# Patient Record
Sex: MALE | Race: WHITE | NOT HISPANIC OR LATINO | Employment: OTHER | URBAN - METROPOLITAN AREA
[De-identification: names, ages, dates, MRNs, and addresses within clinical notes are randomized per-mention and may not be internally consistent; named-entity substitution may affect disease eponyms.]

---

## 2017-08-28 ENCOUNTER — APPOINTMENT (EMERGENCY)
Dept: RADIOLOGY | Facility: HOSPITAL | Age: 62
End: 2017-08-28
Attending: EMERGENCY MEDICINE
Payer: COMMERCIAL

## 2017-08-28 ENCOUNTER — HOSPITAL ENCOUNTER (EMERGENCY)
Facility: HOSPITAL | Age: 62
Discharge: HOME | End: 2017-08-28
Attending: EMERGENCY MEDICINE
Payer: COMMERCIAL

## 2017-08-28 VITALS
TEMPERATURE: 97.8 F | RESPIRATION RATE: 16 BRPM | SYSTOLIC BLOOD PRESSURE: 163 MMHG | WEIGHT: 155 LBS | OXYGEN SATURATION: 97 % | DIASTOLIC BLOOD PRESSURE: 86 MMHG | HEART RATE: 64 BPM

## 2017-08-28 DIAGNOSIS — S43.001A SUBLUXATION OF RIGHT SHOULDER JOINT, INITIAL ENCOUNTER: ICD-10-CM

## 2017-08-28 DIAGNOSIS — S61.412A LACERATION OF LEFT HAND WITHOUT FOREIGN BODY, INITIAL ENCOUNTER: Primary | ICD-10-CM

## 2017-08-28 PROCEDURE — 99284 EMERGENCY DEPT VISIT MOD MDM: CPT

## 2017-08-28 PROCEDURE — 73030 X-RAY EXAM OF SHOULDER: CPT

## 2017-08-28 RX ORDER — LIDOCAINE HYDROCHLORIDE 10 MG/ML
5 INJECTION, SOLUTION EPIDURAL; INFILTRATION; INTRACAUDAL; PERINEURAL ONCE
Status: COMPLETED | OUTPATIENT
Start: 2017-08-28 | End: 2017-08-28

## 2017-08-28 RX ADMIN — LIDOCAINE HYDROCHLORIDE 5 ML: 10 INJECTION, SOLUTION EPIDURAL; INFILTRATION; INTRACAUDAL; PERINEURAL at 18:30

## 2020-03-23 ENCOUNTER — HOSPITAL ENCOUNTER (EMERGENCY)
Facility: HOSPITAL | Age: 65
Discharge: HOME | End: 2020-03-23
Attending: EMERGENCY MEDICINE
Payer: COMMERCIAL

## 2020-03-23 ENCOUNTER — APPOINTMENT (EMERGENCY)
Dept: RADIOLOGY | Facility: HOSPITAL | Age: 65
End: 2020-03-23
Payer: COMMERCIAL

## 2020-03-23 VITALS
BODY MASS INDEX: 26.8 KG/M2 | TEMPERATURE: 98.1 F | HEART RATE: 67 BPM | RESPIRATION RATE: 19 BRPM | HEIGHT: 64 IN | WEIGHT: 157 LBS | DIASTOLIC BLOOD PRESSURE: 87 MMHG | OXYGEN SATURATION: 97 % | SYSTOLIC BLOOD PRESSURE: 176 MMHG

## 2020-03-23 DIAGNOSIS — S61.211A LACERATION OF LEFT INDEX FINGER: Primary | ICD-10-CM

## 2020-03-23 PROCEDURE — 99283 EMERGENCY DEPT VISIT LOW MDM: CPT

## 2020-03-23 PROCEDURE — 90471 IMMUNIZATION ADMIN: CPT

## 2020-03-23 PROCEDURE — 90715 TDAP VACCINE 7 YRS/> IM: CPT | Performed by: EMERGENCY MEDICINE

## 2020-03-23 PROCEDURE — 99284 EMERGENCY DEPT VISIT MOD MDM: CPT | Performed by: EMERGENCY MEDICINE

## 2020-03-23 PROCEDURE — 73140 X-RAY EXAM OF FINGER(S): CPT

## 2020-03-23 PROCEDURE — 12041 INTMD RPR N-HF/GENIT 2.5CM/<: CPT | Performed by: EMERGENCY MEDICINE

## 2020-03-23 RX ORDER — LIDOCAINE HYDROCHLORIDE 20 MG/ML
10 INJECTION, SOLUTION EPIDURAL; INFILTRATION; INTRACAUDAL; PERINEURAL ONCE
Status: COMPLETED | OUTPATIENT
Start: 2020-03-23 | End: 2020-03-23

## 2020-03-23 RX ORDER — GINSENG 100 MG
1 CAPSULE ORAL ONCE
Status: COMPLETED | OUTPATIENT
Start: 2020-03-23 | End: 2020-03-23

## 2020-03-23 RX ADMIN — TETANUS TOXOID, REDUCED DIPHTHERIA TOXOID AND ACELLULAR PERTUSSIS VACCINE, ADSORBED 0.5 ML: 5; 2.5; 8; 8; 2.5 SUSPENSION INTRAMUSCULAR at 12:35

## 2020-03-23 RX ADMIN — LIDOCAINE HYDROCHLORIDE 10 ML: 20 INJECTION, SOLUTION EPIDURAL; INFILTRATION; INTRACAUDAL; PERINEURAL at 12:36

## 2020-03-23 RX ADMIN — BACITRACIN 1 SMALL APPLICATION: 500 OINTMENT TOPICAL at 13:36

## 2020-03-23 NOTE — DISCHARGE INSTRUCTIONS
Leave dressing on until Wednesday then remove and wash with soap and water  Dry thoroughly and apply antibiotic ointment - repeat the next day  After that wash daily with soap and water, dry thoroughly and leave open to air  If going to be in a dirty/belkis environment, keep covered with a dry bandage  Return for any redness, swelling, drainage, fever more than 101, or for any concerns

## 2020-03-23 NOTE — ED PROVIDER NOTES
History  Chief Complaint   Patient presents with    Finger Laceration     pt states he cut his left index finger with a  at work about an hour ago     69y M RHD w/ left index finger injury  Approx 2h pta was at work and was using a thin  when it slipped and went into his left index finger  Has laceration to the tip of the left index finger, bleeding currently controlled  Notes mild pain to the tip of the finger, no numbness/tingling  No other injuries  Unsure of last tetanus      History provided by:  Patient   used: No    Finger Laceration   Location:  Finger  Finger laceration location:  L index finger  Length:  1 5  Depth: Through underlying tissue  Quality: straight    Bleeding: venous and controlled    Time since incident:  2 hours  Injury mechanism:  wheel  Pain details:     Quality:  Aching    Severity:  Mild    Timing:  Constant  Foreign body present:  Unable to specify  Relieved by:  None tried  Worsened by:  Pressure  Ineffective treatments:  None tried  Tetanus status:  Unknown  Associated symptoms: no focal weakness, no numbness and no swelling        Prior to Admission Medications   Prescriptions Last Dose Informant Patient Reported? Taking?   metoprolol tartrate (LOPRESSOR) 25 mg tablet   Yes Yes   Sig: Take 25 mg by mouth daily      Facility-Administered Medications: None       Past Medical History:   Diagnosis Date    Hypertension        Past Surgical History:   Procedure Laterality Date    HERNIA REPAIR         History reviewed  No pertinent family history  I have reviewed and agree with the history as documented  E-Cigarette/Vaping     E-Cigarette/Vaping Substances     Social History     Tobacco Use    Smoking status: Former Smoker    Smokeless tobacco: Never Used   Substance Use Topics    Alcohol use: Never     Frequency: Never    Drug use: Never       Review of Systems   Skin: Positive for wound     Neurological: Negative for focal weakness, weakness and numbness  All other systems reviewed and are negative  Physical Exam  Physical Exam   Constitutional: He appears well-developed and well-nourished  HENT:   Nose: Nose normal    Eyes: Conjunctivae are normal    Neck: Neck supple  Cardiovascular: Normal rate  Pulmonary/Chest: Effort normal    Abdominal: He exhibits no distension  Musculoskeletal: He exhibits no deformity  Left hand: He exhibits tenderness and laceration  Normal sensation noted  Normal strength noted  Hands:  Neurological: He is alert  Skin: Skin is warm  Psychiatric: He has a normal mood and affect  Nursing note and vitals reviewed  Vital Signs  ED Triage Vitals [03/23/20 1151]   Temperature Pulse Respirations Blood Pressure SpO2   98 1 °F (36 7 °C) 67 19 (!) 176/87 97 %      Temp src Heart Rate Source Patient Position - Orthostatic VS BP Location FiO2 (%)   -- Monitor Lying Right arm --      Pain Score       --           Vitals:    03/23/20 1151   BP: (!) 176/87   Pulse: 67   Patient Position - Orthostatic VS: Lying         Visual Acuity      ED Medications  Medications   tetanus-diphtheria-acellular pertussis (BOOSTRIX) IM injection 0 5 mL (0 5 mL Intramuscular Given 3/23/20 1235)   lidocaine (PF) (XYLOCAINE-MPF) 2 % injection 10 mL (10 mL Infiltration Given by Other 3/23/20 1236)   bacitracin topical ointment 1 small application (1 small application Topical Given 3/23/20 1336)       Diagnostic Studies  Results Reviewed     None                 XR finger second digit-index LEFT   ED Interpretation by Margie Velasquez DO (03/23 1255)   No fracture or radiopaque foreign body      Final Result by Anshul Llamas MD (03/23 1307)      No acute osseous abnormality  Degenerative changes as described              Workstation performed: [id number]                    Procedures  Laceration repair  Date/Time: 3/23/2020 2:52 PM  Performed by: Margie Velasquez DO  Authorized by: Jennifer Russo DO Laura   Associated Wounds:   Wound 08/28/17 Laceration Hand Left clean straight linear laceration to palm of hand  Consent: Verbal consent obtained  Consent given by: patient  Patient identity confirmed: verbally with patient  Body area: upper extremity  Location details: left index finger  Laceration length: 1 5 cm  Contamination: The wound is contaminated  Foreign bodies: unknown  Tendon involvement: none  Nerve involvement: none  Vascular damage: no  Anesthesia: digital block    Anesthesia:  Local Anesthetic: lidocaine 2% without epinephrine  Anesthetic total: 5 mL    Sedation:  Patient sedated: no      Wound Dehiscence:  Superficial Wound Dehiscence: simple closure      Procedure Details:  Irrigation solution: saline  Amount of cleaning: extensive  Debridement: none  Degree of undermining: none  Skin closure: 4-0 nylon  Number of sutures: 5  Technique: simple  Approximation: loose  Approximation difficulty: simple  Dressing: antibiotic ointment (bandage)  Patient tolerance: Patient tolerated the procedure well with no immediate complications               ED Course  ED Course as of Mar 23 1458   Mon Mar 23, 2020   1320 D/w pt possibility of retained FB and infection  Discussed wound care  MDM      Disposition  Final diagnoses:   Laceration of left index finger     Time reflects when diagnosis was documented in both MDM as applicable and the Disposition within this note     Time User Action Codes Description Comment    3/23/2020  1:33 PM Doni Mccormick Add [[id number]] Laceration of left index finger       ED Disposition     ED Disposition Condition Date/Time Comment    Discharge Stable Mon Mar 23, 2020  1:33 PM Demar Gray discharge to home/self care              Follow-up Information     Follow up With Specialties Details Why Contact Info    Madelyn Crandall MD Family Medicine  10-14 days for suture removal  [address] [place] [address]  [address] [address]  [phone]            Discharge Medication List as of 3/23/2020  1:37 PM      CONTINUE these medications which have NOT CHANGED    Details   metoprolol tartrate (LOPRESSOR) 25 mg tablet Take 25 mg by mouth daily, Historical Med           No discharge procedures on file      PDMP Review     None          ED Provider  Electronically Signed by           Trevor Christian DO  03/23/20 0621

## 2021-03-10 DIAGNOSIS — Z23 ENCOUNTER FOR IMMUNIZATION: ICD-10-CM

## 2025-07-08 ENCOUNTER — APPOINTMENT (EMERGENCY)
Dept: CT IMAGING | Facility: HOSPITAL | Age: 70
End: 2025-07-08
Payer: COMMERCIAL

## 2025-07-08 ENCOUNTER — APPOINTMENT (EMERGENCY)
Dept: RADIOLOGY | Facility: HOSPITAL | Age: 70
End: 2025-07-08
Payer: COMMERCIAL

## 2025-07-08 ENCOUNTER — HOSPITAL ENCOUNTER (EMERGENCY)
Facility: HOSPITAL | Age: 70
Discharge: HOME | End: 2025-07-08
Attending: EMERGENCY MEDICINE | Admitting: EMERGENCY MEDICINE
Payer: COMMERCIAL

## 2025-07-08 VITALS
HEART RATE: 63 BPM | OXYGEN SATURATION: 98 % | DIASTOLIC BLOOD PRESSURE: 71 MMHG | TEMPERATURE: 98.2 F | SYSTOLIC BLOOD PRESSURE: 135 MMHG | HEIGHT: 63 IN | RESPIRATION RATE: 18 BRPM | WEIGHT: 155 LBS | BODY MASS INDEX: 27.46 KG/M2

## 2025-07-08 DIAGNOSIS — R55 NEAR SYNCOPE: Primary | ICD-10-CM

## 2025-07-08 DIAGNOSIS — I47.10 SVT (SUPRAVENTRICULAR TACHYCARDIA) (HCC): ICD-10-CM

## 2025-07-08 DIAGNOSIS — R07.9 CHEST PAIN: ICD-10-CM

## 2025-07-08 DIAGNOSIS — R91.1 PULMONARY NODULE: ICD-10-CM

## 2025-07-08 DIAGNOSIS — K44.9 HIATAL HERNIA: ICD-10-CM

## 2025-07-08 DIAGNOSIS — I49.1 PAC (PREMATURE ATRIAL CONTRACTION): ICD-10-CM

## 2025-07-08 PROBLEM — I10 ESSENTIAL HYPERTENSION: Status: ACTIVE | Noted: 2025-07-08

## 2025-07-08 LAB
2HR DELTA HS TROPONIN: 4 NG/L
ALBUMIN SERPL BCG-MCNC: 4.6 G/DL (ref 3.5–5)
ALP SERPL-CCNC: 73 U/L (ref 34–104)
ALT SERPL W P-5'-P-CCNC: 42 U/L (ref 7–52)
ANION GAP SERPL CALCULATED.3IONS-SCNC: 9 MMOL/L (ref 4–13)
APTT PPP: 28 SECONDS (ref 23–34)
AST SERPL W P-5'-P-CCNC: 28 U/L (ref 13–39)
BASOPHILS # BLD AUTO: 0.07 THOUSANDS/ÂΜL (ref 0–0.1)
BASOPHILS NFR BLD AUTO: 1 % (ref 0–1)
BILIRUB SERPL-MCNC: 0.85 MG/DL (ref 0.2–1)
BUN SERPL-MCNC: 16 MG/DL (ref 5–25)
CALCIUM SERPL-MCNC: 9.9 MG/DL (ref 8.4–10.2)
CARDIAC TROPONIN I PNL SERPL HS: 4 NG/L (ref ?–50)
CARDIAC TROPONIN I PNL SERPL HS: 8 NG/L (ref ?–50)
CHLORIDE SERPL-SCNC: 107 MMOL/L (ref 96–108)
CO2 SERPL-SCNC: 24 MMOL/L (ref 21–32)
CREAT SERPL-MCNC: 0.85 MG/DL (ref 0.6–1.3)
D DIMER PPP FEU-MCNC: 0.41 UG/ML FEU
EOSINOPHIL # BLD AUTO: 0.18 THOUSAND/ÂΜL (ref 0–0.61)
EOSINOPHIL NFR BLD AUTO: 2 % (ref 0–6)
ERYTHROCYTE [DISTWIDTH] IN BLOOD BY AUTOMATED COUNT: 12.4 % (ref 11.6–15.1)
GFR SERPL CREATININE-BSD FRML MDRD: 87 ML/MIN/1.73SQ M
GLUCOSE SERPL-MCNC: 83 MG/DL (ref 65–140)
HCT VFR BLD AUTO: 49.4 % (ref 36.5–49.3)
HGB BLD-MCNC: 17.2 G/DL (ref 12–17)
IMM GRANULOCYTES # BLD AUTO: 0.05 THOUSAND/UL (ref 0–0.2)
IMM GRANULOCYTES NFR BLD AUTO: 1 % (ref 0–2)
INR PPP: 0.95 (ref 0.85–1.19)
LYMPHOCYTES # BLD AUTO: 2.81 THOUSANDS/ÂΜL (ref 0.6–4.47)
LYMPHOCYTES NFR BLD AUTO: 29 % (ref 14–44)
MAGNESIUM SERPL-MCNC: 1.9 MG/DL (ref 1.9–2.7)
MCH RBC QN AUTO: 31.3 PG (ref 26.8–34.3)
MCHC RBC AUTO-ENTMCNC: 34.8 G/DL (ref 31.4–37.4)
MCV RBC AUTO: 90 FL (ref 82–98)
MONOCYTES # BLD AUTO: 0.72 THOUSAND/ÂΜL (ref 0.17–1.22)
MONOCYTES NFR BLD AUTO: 8 % (ref 4–12)
NEUTROPHILS # BLD AUTO: 5.81 THOUSANDS/ÂΜL (ref 1.85–7.62)
NEUTS SEG NFR BLD AUTO: 59 % (ref 43–75)
NRBC BLD AUTO-RTO: 0 /100 WBCS
PLATELET # BLD AUTO: 271 THOUSANDS/UL (ref 149–390)
PMV BLD AUTO: 9.8 FL (ref 8.9–12.7)
POTASSIUM SERPL-SCNC: 3.8 MMOL/L (ref 3.5–5.3)
PROT SERPL-MCNC: 7.4 G/DL (ref 6.4–8.4)
PROTHROMBIN TIME: 13.1 SECONDS (ref 12.3–15)
RBC # BLD AUTO: 5.49 MILLION/UL (ref 3.88–5.62)
SODIUM SERPL-SCNC: 140 MMOL/L (ref 135–147)
TSH SERPL DL<=0.05 MIU/L-ACNC: 2.31 UIU/ML (ref 0.45–4.5)
WBC # BLD AUTO: 9.64 THOUSAND/UL (ref 4.31–10.16)

## 2025-07-08 PROCEDURE — 96374 THER/PROPH/DIAG INJ IV PUSH: CPT

## 2025-07-08 PROCEDURE — 84443 ASSAY THYROID STIM HORMONE: CPT | Performed by: EMERGENCY MEDICINE

## 2025-07-08 PROCEDURE — 85730 THROMBOPLASTIN TIME PARTIAL: CPT | Performed by: EMERGENCY MEDICINE

## 2025-07-08 PROCEDURE — 71260 CT THORAX DX C+: CPT

## 2025-07-08 PROCEDURE — 99284 EMERGENCY DEPT VISIT MOD MDM: CPT

## 2025-07-08 PROCEDURE — 84484 ASSAY OF TROPONIN QUANT: CPT

## 2025-07-08 PROCEDURE — 85379 FIBRIN DEGRADATION QUANT: CPT | Performed by: EMERGENCY MEDICINE

## 2025-07-08 PROCEDURE — 71045 X-RAY EXAM CHEST 1 VIEW: CPT

## 2025-07-08 PROCEDURE — 99285 EMERGENCY DEPT VISIT HI MDM: CPT | Performed by: EMERGENCY MEDICINE

## 2025-07-08 PROCEDURE — 85025 COMPLETE CBC W/AUTO DIFF WBC: CPT

## 2025-07-08 PROCEDURE — 36415 COLL VENOUS BLD VENIPUNCTURE: CPT

## 2025-07-08 PROCEDURE — 85610 PROTHROMBIN TIME: CPT | Performed by: EMERGENCY MEDICINE

## 2025-07-08 PROCEDURE — 96361 HYDRATE IV INFUSION ADD-ON: CPT

## 2025-07-08 PROCEDURE — 93005 ELECTROCARDIOGRAM TRACING: CPT

## 2025-07-08 PROCEDURE — 80053 COMPREHEN METABOLIC PANEL: CPT

## 2025-07-08 PROCEDURE — 83735 ASSAY OF MAGNESIUM: CPT | Performed by: EMERGENCY MEDICINE

## 2025-07-08 RX ORDER — METOPROLOL SUCCINATE 50 MG/1
1 TABLET, EXTENDED RELEASE ORAL DAILY
COMMUNITY
Start: 2025-06-06

## 2025-07-08 RX ORDER — METOPROLOL TARTRATE 1 MG/ML
5 INJECTION, SOLUTION INTRAVENOUS ONCE
Status: COMPLETED | OUTPATIENT
Start: 2025-07-08 | End: 2025-07-08

## 2025-07-08 RX ORDER — DILTIAZEM HYDROCHLORIDE 5 MG/ML
20 INJECTION INTRAVENOUS ONCE
Status: DISCONTINUED | OUTPATIENT
Start: 2025-07-08 | End: 2025-07-08

## 2025-07-08 RX ADMIN — SODIUM CHLORIDE 1000 ML: 0.9 INJECTION, SOLUTION INTRAVENOUS at 16:21

## 2025-07-08 RX ADMIN — METOPROLOL TARTRATE 5 MG: 5 INJECTION INTRAVENOUS at 18:39

## 2025-07-08 RX ADMIN — IOHEXOL 50 ML: 350 INJECTION, SOLUTION INTRAVENOUS at 17:37

## 2025-07-08 NOTE — ED NOTES
Pt HR decreased to 87 prior to Cardizem administration. ED provider notified. ED provider instructed this RN to hold Cardizem at this time.      Lorenza Cosme RN  07/08/25 5209

## 2025-07-08 NOTE — ED PROVIDER NOTES
Time reflects when diagnosis was documented in both MDM as applicable and the Disposition within this note       Time User Action Codes Description Comment    7/8/2025  7:18 PM John Mcghee [R55] Near syncope     7/8/2025  7:18 PM John Mcghee [I47.10] SVT (supraventricular tachycardia) (HCC)     7/8/2025  7:18 PM John Mcghee [I49.1] PAC (premature atrial contraction)     7/8/2025  7:18 PM John Mcghee [R91.1] Pulmonary nodule     7/8/2025  7:18 PM John Mcghee [K44.9] Hiatal hernia     7/8/2025  7:18 PM John Mcghee [R07.9] Chest pain           ED Disposition       ED Disposition   Discharge    Condition   Stable    Date/Time   Tue Jul 8, 2025  7:18 PM    Comment   Luis Thomas discharge to home/self care.                   Assessment & Plan       Medical Decision Making  Diff includes heat exhaustion, arrhythmia, electrolyte abnl, coronary syndrome, hyperthyroid, gastritis, pneumonia    Trop d-dimer negative.  No significant abnl with labs.  Patient hydrated and feels better, ambulated to bathroom without symptoms.  EKG changed from svt to nsr to nsr with frequent pacs.  Reviewed with cardiology - gave 1 dose lopressor 5 mg IV, frequency of pacs decreased.   BP stable, patient stable for discharge with f/u outpatient.    Amount and/or Complexity of Data Reviewed  Labs: ordered.  Radiology: ordered.    Risk  Prescription drug management.             Medications   sodium chloride 0.9 % bolus 1,000 mL (0 mL Intravenous Stopped 7/8/25 1721)   iohexol (OMNIPAQUE) 350 MG/ML injection (MULTI-DOSE) 50 mL (50 mL Intravenous Given 7/8/25 1737)   metoprolol (LOPRESSOR) injection 5 mg (5 mg Intravenous Given 7/8/25 1839)       ED Risk Strat Scores                    No data recorded        SBIRT 22yo+      Flowsheet Row Most Recent Value   Initial Alcohol Screen: US AUDIT-C     1. How often do you have a drink containing alcohol? 0 Filed at: 07/08/2025 1601   2. How many drinks containing alcohol do you  have on a typical day you are drinking?  0 Filed at: 07/08/2025 1601   3a. Male UNDER 65: How often do you have five or more drinks on one occasion? 0 Filed at: 07/08/2025 1601   3b. FEMALE Any Age, or MALE 65+: How often do you have 4 or more drinks on one occassion? 0 Filed at: 07/08/2025 1601   Audit-C Score 0 Filed at: 07/08/2025 1601   GLORY: How many times in the past year have you...    Used an illegal drug or used a prescription medication for non-medical reasons? Never Filed at: 07/08/2025 1601                            History of Present Illness       Chief Complaint   Patient presents with    Dizziness     Around 230 pt felt dizzy while driving, felt like he was going to pass out so came here. Pt was working outside in the heat. Chest discomfort into jaw for several weeks. Denies n/v/d. Denies headache/blurry vision.          Past Medical History[1]   Past Surgical History[2]   Family History[3]   Social History[4]   E-Cigarette/Vaping      E-Cigarette/Vaping Substances      I have reviewed and agree with the history as documented.     Hx from patient c/o intermittent chest pain 30 minute episodes  over last few weeks then today felt like passing out when driving home after being out in heat working on a vehicle for 3 hours.        Review of Systems   Constitutional:  Negative for chills and fever.   HENT:  Negative for rhinorrhea and sore throat.    Respiratory:  Negative for shortness of breath.    Cardiovascular:  Positive for chest pain.   Gastrointestinal:  Negative for constipation, diarrhea, nausea and vomiting.   Genitourinary:  Negative for dysuria and frequency.   Skin:  Negative for rash.   Neurological:  Positive for light-headedness.   All other systems reviewed and are negative.          Objective       ED Triage Vitals   Temperature Pulse Blood Pressure Respirations SpO2 Patient Position - Orthostatic VS   07/08/25 1602 07/08/25 1602 07/08/25 1602 07/08/25 1602 07/08/25 1602 07/08/25 1605    98.2 °F (36.8 °C) (!) 160 136/78 18 97 % Lying      Temp src Heart Rate Source BP Location FiO2 (%) Pain Score    -- 07/08/25 1602 07/08/25 1605 -- 07/08/25 1605     Monitor Left arm  No Pain      Vitals      Date and Time Temp Pulse SpO2 Resp BP Pain Score FACES Pain Rating User   07/08/25 1930 -- 63 98 % 18 135/71 -- -- MG   07/08/25 1915 -- 65 98 % 20 139/67 -- -- MG   07/08/25 1845 -- 71 99 % 16 168/74 -- -- RN   07/08/25 1830 -- 90 96 % 18 112/66 -- -- RN   07/08/25 1800 -- 94 99 % 19 173/73 -- -- RN   07/08/25 1730 -- 81 98 % 19 135/78 -- -- RN   07/08/25 1700 -- 82 97 % 18 123/78 -- -- RN   07/08/25 1645 -- 86 95 % 16 137/81 -- -- RN   07/08/25 1636 -- 99 97 % 18 123/84 -- -- RN   07/08/25 1630 -- 91 96 % 18 123/84 -- -- RN   07/08/25 1609 -- 160 -- -- -- -- -- RN   07/08/25 1605 -- 163 96 % 18 144/76 No Pain -- RN   07/08/25 1602 98.2 °F (36.8 °C) 160 97 % 18 136/78 -- -- LK            Physical Exam  Vitals and nursing note reviewed.   Constitutional:       Appearance: He is well-developed.   HENT:      Head: Normocephalic and atraumatic.      Right Ear: External ear normal.      Left Ear: External ear normal.      Nose: Nose normal.     Eyes:      Conjunctiva/sclera: Conjunctivae normal.      Pupils: Pupils are equal, round, and reactive to light.       Cardiovascular:      Rate and Rhythm: Tachycardia present. Rhythm irregular.      Heart sounds: Normal heart sounds.   Pulmonary:      Effort: Pulmonary effort is normal. No respiratory distress.      Breath sounds: Normal breath sounds. No wheezing.   Abdominal:      General: Bowel sounds are normal. There is no distension.      Palpations: Abdomen is soft.      Tenderness: There is no abdominal tenderness.     Musculoskeletal:         General: No deformity. Normal range of motion.      Cervical back: Normal range of motion and neck supple. No spinous process tenderness.     Skin:     General: Skin is warm and dry.      Findings: No rash.      Neurological:      General: No focal deficit present.      Mental Status: He is alert.      GCS: GCS eye subscore is 4. GCS verbal subscore is 5. GCS motor subscore is 6.      Sensory: No sensory deficit.     Psychiatric:         Mood and Affect: Mood normal.         Results Reviewed       Procedure Component Value Units Date/Time    HS Troponin I 2hr [[id number]]  (Normal) Collected: 07/08/25 1801    Lab Status: Final result Specimen: Blood from Arm, Right Updated: 07/08/25 1827     hs TnI 2hr 8 ng/L      Delta 2hr hsTnI 4 ng/L     TSH, 3rd generation with Free T4 reflex [[id number]]  (Normal) Collected: 07/08/25 1604    Lab Status: Final result Specimen: Blood from Arm, Right Updated: 07/08/25 1715     TSH 3RD GENERATION 2.307 uIU/mL     D-dimer, quantitative [[id number]]  (Normal) Collected: 07/08/25 1604    Lab Status: Final result Specimen: Blood from Arm, Right Updated: 07/08/25 1644     D-Dimer, Quant 0.41 ug/ml FEU     Narrative:      In the evaluation for possible pulmonary embolism, in the appropriate (Well's Score of 4 or less) patient, the age adjusted d-dimer cutoff for this patient can be calculated as:    Age x 0.01 (in ug/mL) for Age-adjusted D-dimer exclusion threshold for a patient over 50 years.    Protime-INR [[id number]]  (Normal) Collected: 07/08/25 1604    Lab Status: Final result Specimen: Blood from Arm, Right Updated: 07/08/25 1641     Protime 13.1 seconds      INR 0.95    Narrative:      INR Therapeutic Range    Indication                                             INR Range      Atrial Fibrillation                                               2.0-3.0  Hypercoagulable State                                    2.0.2.3  Left Ventricular Asist Device                            2.0-3.0  Mechanical Heart Valve                                  -    Aortic(with afib, MI, embolism, HF, LA enlargement,    and/or coagulopathy)                                     2.0-3.0 (2.5-3.5)     Mitral                                                              2.5-3.5  Prosthetic/Bioprosthetic Heart Valve               2.0-3.0  Venous thromboembolism (VTE: VT, PE        2.0-3.0    APTT [[id number]]  (Normal) Collected: 07/08/25 1604    Lab Status: Final result Specimen: Blood from Arm, Right Updated: 07/08/25 1641     PTT 28 seconds     HS Troponin I 4hr [[id number]]     Lab Status: No result Specimen: Blood     HS Troponin 0hr (reflex protocol) [[id number]]  (Normal) Collected: 07/08/25 1604    Lab Status: Final result Specimen: Blood from Arm, Right Updated: 07/08/25 1637     hs TnI 0hr 4 ng/L     Comprehensive metabolic panel [[id number]] Collected: 07/08/25 1604    Lab Status: Final result Specimen: Blood from Arm, Right Updated: 07/08/25 1633     Sodium 140 mmol/L      Potassium 3.8 mmol/L      Chloride 107 mmol/L      CO2 24 mmol/L      ANION GAP 9 mmol/L      BUN 16 mg/dL      Creatinine 0.85 mg/dL      Glucose 83 mg/dL      Calcium 9.9 mg/dL      AST 28 U/L      ALT 42 U/L      Alkaline Phosphatase 73 U/L      Total Protein 7.4 g/dL      Albumin 4.6 g/dL      Total Bilirubin 0.85 mg/dL      eGFR 87 ml/min/1.73sq m     Narrative:      National Kidney Disease Foundation guidelines for Chronic Kidney Disease (CKD):     Stage 1 with normal or high GFR (GFR > 90 mL/min/1.73 square meters)    Stage 2 Mild CKD (GFR = 60-89 mL/min/1.73 square meters)    Stage 3A Moderate CKD (GFR = 45-59 mL/min/1.73 square meters)    Stage 3B Moderate CKD (GFR = 30-44 mL/min/1.73 square meters)    Stage 4 Severe CKD (GFR = 15-29 mL/min/1.73 square meters)    Stage 5 End Stage CKD (GFR <15 mL/min/1.73 square meters)  Note: GFR calculation is accurate only with a steady state creatinine    Magnesium [[id number]]  (Normal) Collected: 07/08/25 1604    Lab Status: Final result Specimen: Blood from Arm, Right Updated: 07/08/25 1633     Magnesium 1.9 mg/dL     CBC and differential [[id number]]  (Abnormal) Collected: 07/08/25 6009    Lab Status: Final  result Specimen: Blood from Arm, Right Updated: 07/08/25 1616     WBC 9.64 Thousand/uL      RBC 5.49 Million/uL      Hemoglobin 17.2 g/dL      Hematocrit 49.4 %      MCV 90 fL      MCH 31.3 pg      MCHC 34.8 g/dL      RDW 12.4 %      MPV 9.8 fL      Platelets 271 Thousands/uL      nRBC 0 /100 WBCs      Segmented % 59 %      Immature Grans % 1 %      Lymphocytes % 29 %      Monocytes % 8 %      Eosinophils Relative 2 %      Basophils Relative 1 %      Absolute Neutrophils 5.81 Thousands/µL      Absolute Immature Grans 0.05 Thousand/uL      Absolute Lymphocytes 2.81 Thousands/µL      Absolute Monocytes 0.72 Thousand/µL      Eosinophils Absolute 0.18 Thousand/µL      Basophils Absolute 0.07 Thousands/µL             CT chest with contrast   Final Interpretation by Oscar Garcia MD (07/08 1819)      No active disease seen in the chest.      3 mm right upper lobe nodule. Based on current Fleischner Society 2017 Guidelines on incidental pulmonary nodule, no routine follow-up is needed if the patient is low risk. If the patient is high risk, optional follow-up chest CT at 12 months can be    considered.      Small hiatal hernia.      Fatty liver.            Computerized Assisted Algorithm (CAA) may have aided analysis of applicable images.         Workstation performed: [id number]         XR chest portable   Final Interpretation by Talia Rivera MD (07/08 1718)      Increased density projected over the right hilum, new from the prior study. Recommend further evaluation with a CT scan of the chest.      Findings were discussed with Dr. John Mcghee in the emergency department at the time of this dictation.            Workstation performed: [id number]             ECG 12 Lead Documentation Only    Date/Time: 7/8/2025 3:46 PM    Performed by: John Mcghee DO  Authorized by: John Mcghee DO    Indications / Diagnosis:  Chest pain, near-syncope  ECG reviewed by me, the ED Provider: yes    Patient location:  ED  Previous ECG:      Previous ECG:  Unavailable  Interpretation:     Interpretation: abnormal    Rate:     ECG rate:  157    ECG rate assessment: tachycardic    Rhythm:     Rhythm: SVT    Ectopy:     Ectopy: none    QRS:     QRS axis:  Normal    QRS intervals:  Normal  Conduction:     Conduction: normal    ST segments:     ST segments:  Normal  T waves:     T waves: normal    Comments:      This EKG was interpreted by me.  ECG 12 Lead Documentation Only    Date/Time: 7/8/2025 3:50 PM    Performed by: John Mcghee DO  Authorized by: John Mcghee DO    ECG reviewed by me, the ED Provider: yes    Patient location:  ED  Previous ECG:     Previous ECG:  Compared to current    Comparison ECG info:  Minutes prior  Interpretation:     Interpretation: normal    Rate:     ECG rate assessment: normal    Ectopy:     Ectopy: none    QRS:     QRS axis:  Normal    QRS intervals:  Normal  Conduction:     Conduction: normal    ST segments:     ST segments:  Normal  T waves:     T waves: normal    Comments:      This EKG was interpreted by me.  ECG 12 Lead Documentation Only    Date/Time: 7/8/2025 4:37 PM    Performed by: John Mcghee DO  Authorized by: John Mcghee DO    ECG reviewed by me, the ED Provider: yes    Patient location:  ED  Previous ECG:     Previous ECG:  Compared to current  Interpretation:     Interpretation: non-specific    Rate:     ECG rate assessment: normal    Ectopy:     Ectopy: PAC    QRS:     QRS axis:  Normal    QRS intervals:  Normal  Conduction:     Conduction: normal    ST segments:     ST segments:  Normal  T waves:     T waves: normal    Comments:      This EKG was interpreted by me.      ED Medication and Procedure Management   Prior to Admission Medications   Prescriptions Last Dose Informant Patient Reported? Taking?   Omega-3 Fatty Acids (Fish Oil) 300 MG CAPS   Yes No   Sig: Take 2 g by mouth daily   metoprolol succinate (TOPROL-XL) 50 mg 24 hr tablet   Yes Yes   Sig: Take 1 tablet by mouth in the morning       Facility-Administered Medications: None     Discharge Medication List as of 7/8/2025  7:20 PM        CONTINUE these medications which have NOT CHANGED    Details   metoprolol succinate (TOPROL-XL) 50 mg 24 hr tablet Take 1 tablet by mouth in the morning, Starting Fri 6/6/2025, Historical Med      Omega-3 Fatty Acids (Fish Oil) 300 MG CAPS Take 2 g by mouth daily, Historical Med             ED SEPSIS DOCUMENTATION   Time reflects when diagnosis was documented in both MDM as applicable and the Disposition within this note       Time User Action Codes Description Comment    7/8/2025  7:18 PM John Mcghee [R55] Near syncope     7/8/2025  7:18 PM John Mcghee [I47.10] SVT (supraventricular tachycardia) (HCC)     7/8/2025  7:18 PM John Mcghee [I49.1] PAC (premature atrial contraction)     7/8/2025  7:18 PM John Mcghee [R91.1] Pulmonary nodule     7/8/2025  7:18 PM John Mcghee [K44.9] Hiatal hernia     7/8/2025  7:18 PM John Mcghee [R07.9] Chest pain                      [1]   Past Medical History:  Diagnosis Date    Hypertension    [2]   Past Surgical History:  Procedure Laterality Date    HERNIA REPAIR     [3] No family history on file.  [4]   Social History  Tobacco Use    Smoking status: Former    Smokeless tobacco: Never   Substance Use Topics    Alcohol use: Never    Drug use: Never        John Mcghee DO  07/08/25 2038

## 2025-07-09 LAB
ATRIAL RATE: 95 BPM
ATRIAL RATE: 99 BPM
P AXIS: 36 DEGREES
P AXIS: 40 DEGREES
PR INTERVAL: 148 MS
PR INTERVAL: 156 MS
QRS AXIS: 43 DEGREES
QRS AXIS: 50 DEGREES
QRS AXIS: 57 DEGREES
QRSD INTERVAL: 102 MS
QRSD INTERVAL: 86 MS
QRSD INTERVAL: 88 MS
QT INTERVAL: 280 MS
QT INTERVAL: 340 MS
QT INTERVAL: 358 MS
QTC INTERVAL: 437 MS
QTC INTERVAL: 449 MS
QTC INTERVAL: 467 MS
T WAVE AXIS: 11 DEGREES
T WAVE AXIS: 16 DEGREES
T WAVE AXIS: 18 DEGREES
VENTRICULAR RATE: 167 BPM
VENTRICULAR RATE: 95 BPM
VENTRICULAR RATE: 99 BPM

## 2025-07-09 PROCEDURE — 93010 ELECTROCARDIOGRAM REPORT: CPT | Performed by: INTERNAL MEDICINE
